# Patient Record
Sex: FEMALE | Race: WHITE | NOT HISPANIC OR LATINO | Employment: OTHER | URBAN - METROPOLITAN AREA
[De-identification: names, ages, dates, MRNs, and addresses within clinical notes are randomized per-mention and may not be internally consistent; named-entity substitution may affect disease eponyms.]

---

## 2018-06-21 ENCOUNTER — OFFICE VISIT (OUTPATIENT)
Dept: SURGERY | Facility: CLINIC | Age: 73
End: 2018-06-21
Payer: COMMERCIAL

## 2018-06-21 VITALS
HEIGHT: 65 IN | BODY MASS INDEX: 38.32 KG/M2 | WEIGHT: 230 LBS | HEART RATE: 79 BPM | OXYGEN SATURATION: 98 % | TEMPERATURE: 96.6 F

## 2018-06-21 DIAGNOSIS — Z85.3 HISTORY OF BREAST CANCER: Primary | ICD-10-CM

## 2018-06-21 PROCEDURE — 99213 OFFICE O/P EST LOW 20 MIN: CPT | Performed by: SURGERY

## 2018-06-21 RX ORDER — CLONIDINE 0.3 MG/24H
1 PATCH, EXTENDED RELEASE TRANSDERMAL WEEKLY
COMMUNITY

## 2018-06-21 RX ORDER — LEVOTHYROXINE SODIUM 125 UG/1
TABLET ORAL
Refills: 1 | COMMUNITY
Start: 2018-04-24

## 2018-06-21 RX ORDER — ROSUVASTATIN CALCIUM 10 MG/1
5 TABLET, COATED ORAL
COMMUNITY
Start: 2013-06-12

## 2018-06-21 RX ORDER — FUROSEMIDE 20 MG/1
TABLET ORAL
Refills: 0 | COMMUNITY
Start: 2018-06-12

## 2018-06-21 RX ORDER — AZILSARTAN KAMEDOXOMIL 80 MG/1
TABLET ORAL
Refills: 1 | COMMUNITY
Start: 2018-06-13

## 2018-06-21 NOTE — LETTER
June 21, 2018     Candi Albert MD  [address] [facility] [address]    Patient: Jesusita Lindsey   YOB: 1945   Date of Visit: 6/21/2018       Dear Dr. Albert:    Thank you for referring Jesusita Lindsey to me for evaluation. Below are my notes for this consultation.    If you have questions, please do not hesitate to call me. I look forward to following your patient along with you.         Sincerely,        Cesar Ortega MD        CC: No Recipients  Cesar Ortega MD  6/21/2018  2:00 PM  Signed   History and Physical Follow-up Note       Chief Complaint : The patient presents for breast cancer follow-up    History of present illness: The patient underwent segmental resection of the left breast in November 2008.  She continues to do well with no symptoms of recurrent disease.  She has not noticed any change in self-examination.  She had a negative mammogram December 2017.  She has had no new major medical problems..     Medical History: No past medical history on file.    Surgical History: No past surgical history on file.    Allergies: No Known Allergies    Home Medications:    Current Outpatient Prescriptions:   •  cloNIDine (CATAPRES-TTS) 0.3 mg/24 hr, Place 1 patch on the skin once a week. 0.4, Disp: , Rfl:   •  EDARBI 80 mg tablet, TK 1 T PO D, Disp: , Rfl: 1  •  furosemide (LASIX) 20 mg tablet, TK 1 T PO D, Disp: , Rfl: 0  •  rosuvastatin (CRESTOR) 10 mg tablet, 5 mg., Disp: , Rfl:   •  SYNTHROID 125 mcg tablet, TK 1 T PO QD, Disp: , Rfl: 1       Vital Signs:   Vitals:    06/21/18 1135   Pulse: 79   Temp: (!) 35.9 °C (96.6 °F)   SpO2: 98%       Physicial Exam  Physical Exam   Constitutional: She is oriented to person, place, and time. She appears well-developed and well-nourished.   HENT:   Head: Normocephalic and atraumatic.   Eyes: EOM are normal.   Neck: Normal range of motion. Neck supple.   Pulmonary/Chest: Effort normal. Right breast exhibits no inverted nipple, no mass, no nipple discharge,  no skin change and no tenderness. Left breast exhibits no inverted nipple, no nipple discharge, no skin change and no tenderness. Breasts are symmetrical.   Abdominal: Soft. She exhibits no distension and no mass. There is no tenderness. There is no guarding.   Genitourinary: No breast discharge.   Musculoskeletal: Normal range of motion.   Neurological: She is alert and oriented to person, place, and time.   Skin: Skin is warm and dry.   Psychiatric: She has a normal mood and affect. Her behavior is normal. Judgment and thought content normal.       .  Right breast reveals mild to moderate fibrocystic change with no dominant worrisome lesion.  Left breast reveals postsurgical and radiation change with no obvious local regional recurrence of tumor.  There continues to be a stable area of induration towards the lateral aspect of the segmental resection site over 2-2.5 cm.  The patient reports this is unchanged.  I concur    Imaging  I have independently reviewed the patient's Imaging.     Assessment/Plan    Problem List Items Addressed This Visit     History of breast cancer - Primary     The patient continues to do well with no evidence of recurrent disease.  She will continue with monthly self-examinations, her yearly mammograms with her next study in December, her routine oncology follow-up, and we will see her in 1 year, sooner as needed                 Cesar Ortega MD  03/08/18

## 2018-06-21 NOTE — PATIENT INSTRUCTIONS
We discussed you continue to do well with no evidence of recurrent disease.  Please continue with monthly self-examination and get back to me for any change.  You are due for your yearly mammogram in December.  I would like to see you in 1 year, sooner as need be.  Do not hesitate to call with questions.

## 2018-06-21 NOTE — ASSESSMENT & PLAN NOTE
The patient continues to do well with no evidence of recurrent disease.  She will continue with monthly self-examinations, her yearly mammograms with her next study in December, her routine oncology follow-up, and we will see her in 1 year, sooner as needed

## 2018-06-21 NOTE — PROGRESS NOTES
History and Physical Follow-up Note       Chief Complaint : The patient presents for breast cancer follow-up    History of present illness: The patient underwent segmental resection of the left breast in November 2008.  She continues to do well with no symptoms of recurrent disease.  She has not noticed any change in self-examination.  She had a negative mammogram December 2017.  She has had no new major medical problems..     Medical History: No past medical history on file.    Surgical History: No past surgical history on file.    Allergies: No Known Allergies    Home Medications:    Current Outpatient Prescriptions:   •  cloNIDine (CATAPRES-TTS) 0.3 mg/24 hr, Place 1 patch on the skin once a week. 0.4, Disp: , Rfl:   •  EDARBI 80 mg tablet, TK 1 T PO D, Disp: , Rfl: 1  •  furosemide (LASIX) 20 mg tablet, TK 1 T PO D, Disp: , Rfl: 0  •  rosuvastatin (CRESTOR) 10 mg tablet, 5 mg., Disp: , Rfl:   •  SYNTHROID 125 mcg tablet, TK 1 T PO QD, Disp: , Rfl: 1       Vital Signs:   Vitals:    06/21/18 1135   Pulse: 79   Temp: (!) 35.9 °C (96.6 °F)   SpO2: 98%       Physicial Exam  Physical Exam   Constitutional: She is oriented to person, place, and time. She appears well-developed and well-nourished.   HENT:   Head: Normocephalic and atraumatic.   Eyes: EOM are normal.   Neck: Normal range of motion. Neck supple.   Pulmonary/Chest: Effort normal. Right breast exhibits no inverted nipple, no mass, no nipple discharge, no skin change and no tenderness. Left breast exhibits no inverted nipple, no nipple discharge, no skin change and no tenderness. Breasts are symmetrical.   Abdominal: Soft. She exhibits no distension and no mass. There is no tenderness. There is no guarding.   Genitourinary: No breast discharge.   Musculoskeletal: Normal range of motion.   Neurological: She is alert and oriented to person, place, and time.   Skin: Skin is warm and dry.   Psychiatric: She has a normal mood and affect. Her behavior is normal.  Judgment and thought content normal.       .  Right breast reveals mild to moderate fibrocystic change with no dominant worrisome lesion.  Left breast reveals postsurgical and radiation change with no obvious local regional recurrence of tumor.  There continues to be a stable area of induration towards the lateral aspect of the segmental resection site over 2-2.5 cm.  The patient reports this is unchanged.  I concur    Imaging  I have independently reviewed the patient's Imaging.     Assessment/Plan    Problem List Items Addressed This Visit     History of breast cancer - Primary     The patient continues to do well with no evidence of recurrent disease.  She will continue with monthly self-examinations, her yearly mammograms with her next study in December, her routine oncology follow-up, and we will see her in 1 year, sooner as needed                 Cesar Ortega MD  03/08/18

## 2018-11-02 ENCOUNTER — TRANSCRIBE ORDERS (OUTPATIENT)
Dept: SCHEDULING | Age: 73
End: 2018-11-02

## 2018-11-02 DIAGNOSIS — Z12.31 ENCOUNTER FOR SCREENING MAMMOGRAM FOR MALIGNANT NEOPLASM OF BREAST: Primary | ICD-10-CM

## 2018-11-02 DIAGNOSIS — Z85.3 PERSONAL HISTORY OF MALIGNANT NEOPLASM OF BREAST: ICD-10-CM

## 2018-12-19 ENCOUNTER — HOSPITAL ENCOUNTER (OUTPATIENT)
Dept: RADIOLOGY | Age: 73
Discharge: HOME | End: 2018-12-19
Attending: INTERNAL MEDICINE
Payer: COMMERCIAL

## 2018-12-19 ENCOUNTER — TRANSCRIBE ORDERS (OUTPATIENT)
Dept: RADIOLOGY | Age: 73
End: 2018-12-19

## 2018-12-19 DIAGNOSIS — Z85.3 PERSONAL HISTORY OF MALIGNANT NEOPLASM OF BREAST: ICD-10-CM

## 2018-12-19 DIAGNOSIS — Z12.31 ENCOUNTER FOR SCREENING MAMMOGRAM FOR MALIGNANT NEOPLASM OF BREAST: ICD-10-CM

## 2018-12-19 PROCEDURE — G0279 TOMOSYNTHESIS, MAMMO: HCPCS

## 2019-06-20 ENCOUNTER — OFFICE VISIT (OUTPATIENT)
Dept: SURGERY | Facility: CLINIC | Age: 74
End: 2019-06-20
Payer: COMMERCIAL

## 2019-06-20 VITALS
BODY MASS INDEX: 38.32 KG/M2 | DIASTOLIC BLOOD PRESSURE: 90 MMHG | SYSTOLIC BLOOD PRESSURE: 160 MMHG | WEIGHT: 230 LBS | HEIGHT: 65 IN

## 2019-06-20 DIAGNOSIS — Z12.11 COLON CANCER SCREENING: Primary | ICD-10-CM

## 2019-06-20 DIAGNOSIS — Z85.3 HISTORY OF BREAST CANCER: ICD-10-CM

## 2019-06-20 DIAGNOSIS — Z12.39 SCREENING BREAST EXAMINATION: ICD-10-CM

## 2019-06-20 PROCEDURE — 99213 OFFICE O/P EST LOW 20 MIN: CPT | Performed by: SURGERY

## 2019-06-20 ASSESSMENT — ENCOUNTER SYMPTOMS
VOICE CHANGE: 0
WOUND: 0
NECK PAIN: 0
DIZZINESS: 0
JOINT SWELLING: 0
DIARRHEA: 0
TROUBLE SWALLOWING: 0
ACTIVITY CHANGE: 0
ABDOMINAL DISTENTION: 0
DYSURIA: 0
SEIZURES: 0
WHEEZING: 0
BLOOD IN STOOL: 0
SHORTNESS OF BREATH: 0
WEAKNESS: 0
APPETITE CHANGE: 0
CHEST TIGHTNESS: 0
BRUISES/BLEEDS EASILY: 0
NAUSEA: 0
FREQUENCY: 0
TREMORS: 0
SINUS PAIN: 0
CONFUSION: 0
FATIGUE: 0
FACIAL SWELLING: 0
DIFFICULTY URINATING: 0
UNEXPECTED WEIGHT CHANGE: 0
NECK STIFFNESS: 0
VOMITING: 0
SLEEP DISTURBANCE: 0
MYALGIAS: 0
FLANK PAIN: 0
SORE THROAT: 0
ARTHRALGIAS: 0
HEMATURIA: 0
PALPITATIONS: 0
COUGH: 0
HEADACHES: 0
ABDOMINAL PAIN: 0
FEVER: 0
CHILLS: 0

## 2019-06-20 NOTE — PROGRESS NOTES
History and Physical Follow-up Note       Chief Complaint : The patient presents for breast cancer follow-up    History of present illness: The patient underwent segmental resection of the left breast November 2008.  She continues to do well with no symptoms of recurrent disease.  She has not noticed any change in self-examination.  She had a negative mammogram December 2018.  She has had no new major medical problems since she was last seen..     Medical History:   Past Medical History:   Diagnosis Date   • Breast cancer (CMS/HCC) (HCC)    • Disease of thyroid gland    • Hypertension    • Lipid disorder        Surgical History:   Past Surgical History:   Procedure Laterality Date   • MASTECTOMY, PARTIAL  02/2000       Allergies: No Known Allergies    Home Medications:    Current Outpatient Prescriptions:   •  cloNIDine (CATAPRES-TTS) 0.3 mg/24 hr, Place 1 patch on the skin once a week. 0.4, Disp: , Rfl:   •  EDARBI 80 mg tablet, TK 1 T PO D, Disp: , Rfl: 1  •  furosemide (LASIX) 20 mg tablet, TK 1 T PO D, Disp: , Rfl: 0  •  rosuvastatin (CRESTOR) 10 mg tablet, 5 mg., Disp: , Rfl:   •  SYNTHROID 125 mcg tablet, TK 1 T PO QD, Disp: , Rfl: 1  Review of Systems   Constitutional: Negative for activity change, appetite change, chills, fatigue, fever and unexpected weight change.   HENT: Negative for congestion, facial swelling, hearing loss, nosebleeds, sinus pain, sneezing, sore throat, trouble swallowing and voice change.    Eyes: Negative for visual disturbance.   Respiratory: Negative for cough, chest tightness, shortness of breath and wheezing.    Cardiovascular: Negative for chest pain, palpitations and leg swelling.   Gastrointestinal: Negative for abdominal distention, abdominal pain, blood in stool, diarrhea, nausea and vomiting.   Endocrine: Negative for cold intolerance and heat intolerance.   Genitourinary: Negative for decreased urine volume, difficulty urinating, dysuria, flank pain, frequency, hematuria  and urgency.   Musculoskeletal: Negative for arthralgias, gait problem, joint swelling, myalgias, neck pain and neck stiffness.   Skin: Negative for rash and wound.   Allergic/Immunologic: Negative for environmental allergies and food allergies.   Neurological: Negative for dizziness, tremors, seizures, weakness and headaches.   Hematological: Does not bruise/bleed easily.   Psychiatric/Behavioral: Negative for confusion and sleep disturbance.        Vital Signs:   Vitals:    06/20/19 1123   BP: (!) 160/90       Physicial Exam  Physical Exam   Constitutional: She is oriented to person, place, and time. She appears well-developed and well-nourished.   HENT:   Head: Normocephalic.   Eyes: EOM are normal.   Neck: Normal range of motion. Neck supple.   Pulmonary/Chest: Effort normal. No respiratory distress. Right breast exhibits no inverted nipple, no mass, no nipple discharge, no skin change and no tenderness. Left breast exhibits no inverted nipple, no mass, no nipple discharge, no skin change and no tenderness. Breasts are symmetrical.   Abdominal: Soft. She exhibits no distension and no mass. There is no tenderness. There is no guarding.   Genitourinary: No breast discharge.   Musculoskeletal: Normal range of motion.   Neurological: She is alert and oriented to person, place, and time.   Skin: Skin is warm and dry.   Psychiatric: She has a normal mood and affect. Her behavior is normal. Judgment and thought content normal.       .  Right breast reveals mild to moderate fibrocystic change with no dominant.  Left breast reveals posttreatment change with no evidence of local regional recurrence of tumor.  There is 3 to 4 cm of chronic induration in the upper outer quadrant in the area of previous resection.  This is unchanged.  There is no regional lymphadenopathy.    Imaging  I have independently reviewed the patient's Imaging    Assessment/Plan    Problem List Items Addressed This Visit     History of breast cancer      The patient continues to do well with no evidence of recurrent disease.  She will continue with monthly self-examinations, have her yearly mammogram December, to continue with her routine oncology follow-up, and we will see her in 1 year, sooner as needed           Other Visit Diagnoses     Colon cancer screening    -  Primary    Screening breast examination        Relevant Orders    BI SCREENING MAMMOGRAM BILATERAL            Cesar Ortega MD  03/08/18

## 2019-06-20 NOTE — ASSESSMENT & PLAN NOTE
The patient continues to do well with no evidence of recurrent disease.  She will continue with monthly self-examinations, have her yearly mammogram December, to continue with her routine oncology follow-up, and we will see her in 1 year, sooner as needed

## 2019-06-20 NOTE — PATIENT INSTRUCTIONS
As we discussed, you continue to do well with no evidence of recurrent disease.  Please continue with monthly self-examinations, have your yearly mammogram in December, continue with your routine oncology follow-up, we will see you in 1 year, sooner as needed.  Do not hesitate to call with questions.

## 2019-06-20 NOTE — LETTER
June 20, 2019     Candi Albert MD  [address] [facility] [address]    Patient: Jesusita Lindsey  YOB: 1945  Date of Visit: 6/20/2019      Dear Dr. Albert:    Thank you for referring Jesusita Lindsey to me for evaluation. Below are my notes for this consultation.    If you have questions, please do not hesitate to call me. I look forward to following your patient along with you.         Sincerely,        Cesar Ortega MD        CC: No Recipients  Cesar Ortega MD  6/20/2019  2:23 PM  Signed   History and Physical Follow-up Note       Chief Complaint : The patient presents for breast cancer follow-up    History of present illness: The patient underwent segmental resection of the left breast November 2008.  She continues to do well with no symptoms of recurrent disease.  She has not noticed any change in self-examination.  She had a negative mammogram December 2018.  She has had no new major medical problems since she was last seen..     Medical History:   Past Medical History:   Diagnosis Date   • Breast cancer (CMS/HCC) (HCC)    • Disease of thyroid gland    • Hypertension    • Lipid disorder        Surgical History:   Past Surgical History:   Procedure Laterality Date   • MASTECTOMY, PARTIAL  02/2000       Allergies: No Known Allergies    Home Medications:    Current Outpatient Prescriptions:   •  cloNIDine (CATAPRES-TTS) 0.3 mg/24 hr, Place 1 patch on the skin once a week. 0.4, Disp: , Rfl:   •  EDARBI 80 mg tablet, TK 1 T PO D, Disp: , Rfl: 1  •  furosemide (LASIX) 20 mg tablet, TK 1 T PO D, Disp: , Rfl: 0  •  rosuvastatin (CRESTOR) 10 mg tablet, 5 mg., Disp: , Rfl:   •  SYNTHROID 125 mcg tablet, TK 1 T PO QD, Disp: , Rfl: 1  Review of Systems   Constitutional: Negative for activity change, appetite change, chills, fatigue, fever and unexpected weight change.   HENT: Negative for congestion, facial swelling, hearing loss, nosebleeds, sinus pain, sneezing, sore throat, trouble swallowing and  voice change.    Eyes: Negative for visual disturbance.   Respiratory: Negative for cough, chest tightness, shortness of breath and wheezing.    Cardiovascular: Negative for chest pain, palpitations and leg swelling.   Gastrointestinal: Negative for abdominal distention, abdominal pain, blood in stool, diarrhea, nausea and vomiting.   Endocrine: Negative for cold intolerance and heat intolerance.   Genitourinary: Negative for decreased urine volume, difficulty urinating, dysuria, flank pain, frequency, hematuria and urgency.   Musculoskeletal: Negative for arthralgias, gait problem, joint swelling, myalgias, neck pain and neck stiffness.   Skin: Negative for rash and wound.   Allergic/Immunologic: Negative for environmental allergies and food allergies.   Neurological: Negative for dizziness, tremors, seizures, weakness and headaches.   Hematological: Does not bruise/bleed easily.   Psychiatric/Behavioral: Negative for confusion and sleep disturbance.        Vital Signs:   Vitals:    06/20/19 1123   BP: (!) 160/90       Physicial Exam  Physical Exam   Constitutional: She is oriented to person, place, and time. She appears well-developed and well-nourished.   HENT:   Head: Normocephalic.   Eyes: EOM are normal.   Neck: Normal range of motion. Neck supple.   Pulmonary/Chest: Effort normal. No respiratory distress. Right breast exhibits no inverted nipple, no mass, no nipple discharge, no skin change and no tenderness. Left breast exhibits no inverted nipple, no mass, no nipple discharge, no skin change and no tenderness. Breasts are symmetrical.   Abdominal: Soft. She exhibits no distension and no mass. There is no tenderness. There is no guarding.   Genitourinary: No breast discharge.   Musculoskeletal: Normal range of motion.   Neurological: She is alert and oriented to person, place, and time.   Skin: Skin is warm and dry.   Psychiatric: She has a normal mood and affect. Her behavior is normal. Judgment and thought  content normal.       .  Right breast reveals mild to moderate fibrocystic change with no dominant.  Left breast reveals posttreatment change with no evidence of local regional recurrence of tumor.  There is 3 to 4 cm of chronic induration in the upper outer quadrant in the area of previous resection.  This is unchanged.  There is no regional lymphadenopathy.    Imaging  I have independently reviewed the patient's Imaging    Assessment/Plan    Problem List Items Addressed This Visit     History of breast cancer     The patient continues to do well with no evidence of recurrent disease.  She will continue with monthly self-examinations, have her yearly mammogram December, to continue with her routine oncology follow-up, and we will see her in 1 year, sooner as needed           Other Visit Diagnoses     Colon cancer screening    -  Primary    Screening breast examination        Relevant Orders    BI SCREENING MAMMOGRAM BILATERAL            Cesar Ortega MD  03/08/18

## 2019-12-18 ENCOUNTER — HOSPITAL ENCOUNTER (OUTPATIENT)
Dept: RADIOLOGY | Age: 74
Discharge: HOME | End: 2019-12-18
Attending: SURGERY
Payer: COMMERCIAL

## 2019-12-18 DIAGNOSIS — Z12.39 SCREENING BREAST EXAMINATION: ICD-10-CM

## 2019-12-18 PROCEDURE — 77067 SCR MAMMO BI INCL CAD: CPT

## 2021-04-06 ENCOUNTER — TRANSCRIBE ORDERS (OUTPATIENT)
Dept: SCHEDULING | Age: 76
End: 2021-04-06

## 2021-04-06 DIAGNOSIS — Z12.31 ENCOUNTER FOR SCREENING MAMMOGRAM FOR MALIGNANT NEOPLASM OF BREAST: Primary | ICD-10-CM

## 2021-06-07 ENCOUNTER — HOSPITAL ENCOUNTER (OUTPATIENT)
Dept: RADIOLOGY | Age: 76
Discharge: HOME | End: 2021-06-07
Attending: INTERNAL MEDICINE
Payer: COMMERCIAL

## 2021-06-07 DIAGNOSIS — Z12.31 ENCOUNTER FOR SCREENING MAMMOGRAM FOR MALIGNANT NEOPLASM OF BREAST: ICD-10-CM

## 2021-06-07 PROCEDURE — 77063 BREAST TOMOSYNTHESIS BI: CPT

## 2021-06-07 PROCEDURE — 77067 SCR MAMMO BI INCL CAD: CPT

## 2022-04-26 ENCOUNTER — PREPPED CHART (OUTPATIENT)
Dept: URBAN - METROPOLITAN AREA CLINIC 94 | Facility: CLINIC | Age: 77
End: 2022-04-26

## 2022-04-26 PROBLEM — Z01.00 ENCOUNTER FOR EXAMINATION OF EYES AND VISION WITHOUT ABNORMAL FINDINGS: Noted: 2022-04-26

## 2022-07-05 ENCOUNTER — TRANSCRIBE ORDERS (OUTPATIENT)
Dept: RADIOLOGY | Age: 77
End: 2022-07-05

## 2022-07-05 ENCOUNTER — HOSPITAL ENCOUNTER (OUTPATIENT)
Dept: RADIOLOGY | Age: 77
Discharge: HOME | End: 2022-07-05
Attending: INTERNAL MEDICINE
Payer: COMMERCIAL

## 2022-07-05 DIAGNOSIS — Z12.31 ENCOUNTER FOR SCREENING MAMMOGRAM FOR MALIGNANT NEOPLASM OF BREAST: ICD-10-CM

## 2022-07-05 DIAGNOSIS — Z12.31 ENCOUNTER FOR SCREENING MAMMOGRAM FOR MALIGNANT NEOPLASM OF BREAST: Primary | ICD-10-CM

## 2022-07-05 PROCEDURE — 77067 SCR MAMMO BI INCL CAD: CPT

## 2023-06-22 ENCOUNTER — COMPLETE EYE EXAM (OUTPATIENT)
Dept: URBAN - METROPOLITAN AREA CLINIC 94 | Facility: CLINIC | Age: 78
End: 2023-06-22

## 2023-06-22 DIAGNOSIS — H04.123: ICD-10-CM

## 2023-06-22 PROCEDURE — 92014 COMPRE OPH EXAM EST PT 1/>: CPT

## 2023-06-22 ASSESSMENT — TONOMETRY
OS_IOP_MMHG: 13
OD_IOP_MMHG: 12

## 2023-06-22 ASSESSMENT — VISUAL ACUITY
OD_SC: 20/25-2
OS_SC: 20/25-1

## 2023-07-11 ENCOUNTER — TRANSCRIBE ORDERS (OUTPATIENT)
Dept: RADIOLOGY | Age: 78
End: 2023-07-11

## 2023-07-11 ENCOUNTER — HOSPITAL ENCOUNTER (OUTPATIENT)
Dept: RADIOLOGY | Age: 78
Discharge: HOME | End: 2023-07-11
Attending: INTERNAL MEDICINE
Payer: COMMERCIAL

## 2023-07-11 DIAGNOSIS — Z12.31 ENCOUNTER FOR SCREENING MAMMOGRAM FOR MALIGNANT NEOPLASM OF BREAST: Primary | ICD-10-CM

## 2023-07-11 DIAGNOSIS — Z12.31 ENCOUNTER FOR SCREENING MAMMOGRAM FOR MALIGNANT NEOPLASM OF BREAST: ICD-10-CM

## 2023-07-11 PROCEDURE — 77063 BREAST TOMOSYNTHESIS BI: CPT

## 2024-06-13 ENCOUNTER — COMPLETE EYE EXAM (OUTPATIENT)
Dept: URBAN - METROPOLITAN AREA CLINIC 82 | Facility: CLINIC | Age: 79
End: 2024-06-13

## 2024-06-13 DIAGNOSIS — H04.123: ICD-10-CM

## 2024-06-13 DIAGNOSIS — Z96.1: ICD-10-CM

## 2024-06-13 PROCEDURE — 92014 COMPRE OPH EXAM EST PT 1/>: CPT

## 2024-06-13 ASSESSMENT — VISUAL ACUITY
OD_SC: 20/25+2
OS_CC: 20/25
OS_SC: 20/20
OD_CC: 20/20
OU_CC: 20/20

## 2024-06-13 ASSESSMENT — TONOMETRY
OS_IOP_MMHG: 12
OD_IOP_MMHG: 11

## 2024-07-16 ENCOUNTER — HOSPITAL ENCOUNTER (OUTPATIENT)
Dept: RADIOLOGY | Age: 79
Discharge: HOME | End: 2024-07-16
Attending: INTERNAL MEDICINE
Payer: COMMERCIAL

## 2024-07-16 ENCOUNTER — TRANSCRIBE ORDERS (OUTPATIENT)
Dept: RADIOLOGY | Age: 79
End: 2024-07-16

## 2024-07-16 DIAGNOSIS — Z12.31 ENCOUNTER FOR SCREENING MAMMOGRAM FOR MALIGNANT NEOPLASM OF BREAST: Primary | ICD-10-CM

## 2024-07-16 DIAGNOSIS — Z12.31 ENCOUNTER FOR SCREENING MAMMOGRAM FOR MALIGNANT NEOPLASM OF BREAST: ICD-10-CM

## 2024-07-16 PROCEDURE — 77067 SCR MAMMO BI INCL CAD: CPT
